# Patient Record
Sex: FEMALE | Race: WHITE | NOT HISPANIC OR LATINO | Employment: FULL TIME | URBAN - METROPOLITAN AREA
[De-identification: names, ages, dates, MRNs, and addresses within clinical notes are randomized per-mention and may not be internally consistent; named-entity substitution may affect disease eponyms.]

---

## 2023-12-01 ENCOUNTER — OFFICE VISIT (OUTPATIENT)
Dept: URGENT CARE | Facility: CLINIC | Age: 43
End: 2023-12-01
Payer: COMMERCIAL

## 2023-12-01 VITALS
OXYGEN SATURATION: 98 % | DIASTOLIC BLOOD PRESSURE: 99 MMHG | SYSTOLIC BLOOD PRESSURE: 139 MMHG | RESPIRATION RATE: 16 BRPM | TEMPERATURE: 97.7 F | HEART RATE: 76 BPM | WEIGHT: 200 LBS | BODY MASS INDEX: 31.32 KG/M2

## 2023-12-01 DIAGNOSIS — M54.12 CERVICAL RADICULOPATHY: Primary | ICD-10-CM

## 2023-12-01 PROCEDURE — 1036F TOBACCO NON-USER: CPT | Performed by: PHYSICIAN ASSISTANT

## 2023-12-01 PROCEDURE — 99214 OFFICE O/P EST MOD 30 MIN: CPT | Performed by: PHYSICIAN ASSISTANT

## 2023-12-01 RX ORDER — METHYLPREDNISOLONE 4 MG/1
TABLET ORAL
Qty: 21 TABLET | Refills: 0 | Status: SHIPPED | OUTPATIENT
Start: 2023-12-01

## 2023-12-01 RX ORDER — METHOCARBAMOL 750 MG/1
750 TABLET, FILM COATED ORAL 4 TIMES DAILY
Qty: 40 TABLET | Refills: 0 | Status: SHIPPED | OUTPATIENT
Start: 2023-12-01 | End: 2023-12-11

## 2023-12-01 ASSESSMENT — ENCOUNTER SYMPTOMS
FEVER: 0
NECK STIFFNESS: 1
EYE REDNESS: 0
BACK PAIN: 0
SHORTNESS OF BREATH: 0
NEUROLOGICAL NEGATIVE: 1
HEMATOLOGIC/LYMPHATIC NEGATIVE: 1
PSYCHIATRIC NEGATIVE: 1
FATIGUE: 0
WHEEZING: 0
EYE DISCHARGE: 0
ACTIVITY CHANGE: 0
EYE PAIN: 0
NECK PAIN: 1
COUGH: 0
APPETITE CHANGE: 0
ALLERGIC/IMMUNOLOGIC NEGATIVE: 1
COLOR CHANGE: 0
ENDOCRINE NEGATIVE: 1
ARTHRALGIAS: 0
WOUND: 0

## 2023-12-01 ASSESSMENT — PAIN SCALES - GENERAL: PAINLEVEL: 7

## 2023-12-01 NOTE — PROGRESS NOTES
Subjective   Patient ID: Dilcia Najera is a 43 y.o. female who presents for complaints of right-sided tingling sharp radiating pain in the right upper extremity that shoots down to digits 4 and 5 from posterior shoulder.  She denies any trauma to the spine.  Denies any fevers or chills.  Denies any history of IV drug use.  She otherwise is afebrile and nontoxic-appearing.  She does note that she feels like has had decreased strength in the right hand at times.    Past Medical History:   Diagnosis Date    Personal history of other diseases of the circulatory system     History of hypertension    Personal history of other diseases of the digestive system     History of esophageal reflux    Personal history of other diseases of the respiratory system     History of asthma       Review of Systems   Constitutional:  Negative for activity change, appetite change, fatigue and fever.   Eyes:  Negative for pain, discharge and redness.   Respiratory:  Negative for cough, shortness of breath and wheezing.    Cardiovascular:  Negative for chest pain and leg swelling.   Endocrine: Negative.    Musculoskeletal:  Positive for neck pain and neck stiffness. Negative for arthralgias, back pain and gait problem.   Skin:  Negative for color change, rash and wound.   Allergic/Immunologic: Negative.    Neurological: Negative.    Hematological: Negative.    Psychiatric/Behavioral: Negative.         Objective   BP (!) 139/99   Pulse 76   Temp 36.5 °C (97.7 °F)   Resp 16   Wt 90.7 kg (200 lb)   SpO2 98%   BMI 31.32 kg/m²   Physical Exam  Constitutional:       General: She is not in acute distress.     Appearance: Normal appearance. She is not ill-appearing, toxic-appearing or diaphoretic.   HENT:      Head: Normocephalic and atraumatic.      Mouth/Throat:      Mouth: Mucous membranes are moist.      Pharynx: Oropharynx is clear.   Eyes:      Conjunctiva/sclera: Conjunctivae normal.   Cardiovascular:      Rate and Rhythm: Normal rate  and regular rhythm.      Pulses:           Radial pulses are 2+ on the right side and 2+ on the left side.      Heart sounds: No murmur heard.  Pulmonary:      Effort: Pulmonary effort is normal.      Breath sounds: Normal breath sounds.   Musculoskeletal:         General: No swelling, tenderness, deformity or signs of injury. Normal range of motion.      Cervical back: Normal range of motion and neck supple.      Comments: There is no tenderness over the midline cervical spine   Skin:     General: Skin is warm and dry.      Findings: No erythema or rash.   Neurological:      General: No focal deficit present.      Mental Status: She is alert and oriented to person, place, and time.      Gait: Gait normal.      Comments: Sensation is intact distally into the bilateral upper extremities.  Strength is 5 out of 5 in bilateral upper extremities tested at the shoulder the elbow and the wrist bilaterally  strength is 5 out of 5 bilaterally         Assessment/Plan   Problem List Items Addressed This Visit       Cervical radiculopathy - Primary    Relevant Medications    methylPREDNISolone (Medrol Dospak) 4 mg tablets    methocarbamol (Robaxin-750) 750 mg tablet   -Discussed with patient that the history and physical exam raise high suspicion for likely cervical radiculopathy.  Treating with methylprednisolone pack as well as methocarbamol given the tightness in the neck.  No notable strength deficit at time of exam though I did discuss with patient if she has any worsening strength in the right upper extremity I would recommend prompt follow-up.    -No risk factors for central cord syndrome, spinal epidural abscess or other emergent etiologies of this complaint

## 2023-12-01 NOTE — PATIENT INSTRUCTIONS
Assessment/Plan   Problem List Items Addressed This Visit       Cervical radiculopathy - Primary    Relevant Medications    methylPREDNISolone (Medrol Dospak) 4 mg tablets    methocarbamol (Robaxin-750) 750 mg tablet   -Discussed with patient that the history and physical exam raise high suspicion for likely cervical radiculopathy.  Treating with methylprednisolone pack as well as methocarbamol given the tightness in the neck.  No notable strength deficit at time of exam though I did discuss with patient if she has any worsening strength in the right upper extremity I would recommend prompt follow-up.    -No risk factors for central cord syndrome, spinal epidural abscess or other emergent etiologies of this complaint

## 2023-12-15 ENCOUNTER — OFFICE VISIT (OUTPATIENT)
Dept: URGENT CARE | Facility: CLINIC | Age: 43
End: 2023-12-15
Payer: COMMERCIAL

## 2023-12-15 VITALS
WEIGHT: 200 LBS | TEMPERATURE: 97.9 F | HEART RATE: 92 BPM | OXYGEN SATURATION: 95 % | DIASTOLIC BLOOD PRESSURE: 95 MMHG | RESPIRATION RATE: 18 BRPM | SYSTOLIC BLOOD PRESSURE: 153 MMHG | BODY MASS INDEX: 31.32 KG/M2

## 2023-12-15 DIAGNOSIS — J01.90 ACUTE SINUSITIS WITH SYMPTOMS > 10 DAYS: Primary | ICD-10-CM

## 2023-12-15 PROBLEM — E66.9 OBESITY, CLASS I, BMI 30-34.9: Status: ACTIVE | Noted: 2021-09-07

## 2023-12-15 PROBLEM — E66.811 OBESITY, CLASS I, BMI 30-34.9: Status: ACTIVE | Noted: 2021-09-07

## 2023-12-15 PROCEDURE — 99213 OFFICE O/P EST LOW 20 MIN: CPT | Performed by: PHYSICIAN ASSISTANT

## 2023-12-15 PROCEDURE — 1036F TOBACCO NON-USER: CPT | Performed by: PHYSICIAN ASSISTANT

## 2023-12-15 RX ORDER — AMOXICILLIN AND CLAVULANATE POTASSIUM 875; 125 MG/1; MG/1
1 TABLET, FILM COATED ORAL 2 TIMES DAILY
Qty: 14 TABLET | Refills: 0 | Status: SHIPPED | OUTPATIENT
Start: 2023-12-15 | End: 2023-12-22

## 2023-12-15 ASSESSMENT — PAIN SCALES - GENERAL: PAINLEVEL: 6

## 2023-12-15 NOTE — PATIENT INSTRUCTIONS
Assessment/Plan   Problem List Items Addressed This Visit       Acute sinusitis with symptoms > 10 days - Primary    Relevant Medications    amoxicillin-pot clavulanate (Augmentin) 875-125 mg tablet   Sinusitis Plan:  1. Take medication as directed for therapy:  2. Follow up with your family doctor in 3-5 days.    Symptoms may last greater than 14 days, but expect improving trend in less than 10 days; purulent nasal discharge alone does not indicate bacterial infection.    Sinusitis is defined as acute if it is totally resolved in less than 30 days. In people who have a normally functioning immune system . Acute sinusitis is usually caused by a viral infection. Sometimes acute sinusitis is caused by bacteria. Infection often develops after something blocks the openings to the sinuses. Such blockage commonly results from a viral infection of the upper airways, such as the common cold. During a cold, the swollen mucous membranes of the nasal cavity tend to block the openings of the sinuses. Air in the sinuses is absorbed into the bloodstream, and the pressure inside the sinuses decreases, causing pain and drawing fluid into the sinuses. This fluid is a breeding ground for bacteria. White blood cells and more fluid enter the sinuses to fight the bacteria. This influx increases the pressure and causes more pain.

## 2023-12-15 NOTE — PROGRESS NOTES
Subjective   Patient ID: Dilcia Najera is a 43 y.o. female who presents for Nasal Congestion (Swollen lymph nodes x 2 week negative covid today).  Patient endorses nasal congestion, purulent nasal discharge that is been worsening over the course the last 2 weeks.  She denies any nausea vomiting diarrhea or abdominal pain any chest pain or shortness of breath.  She did test for COVID today and this was negative.    Past Medical History:   Diagnosis Date    Personal history of other diseases of the circulatory system     History of hypertension    Personal history of other diseases of the digestive system     History of esophageal reflux    Personal history of other diseases of the respiratory system     History of asthma         The remainder of the systems were reviewed and are negative unless noted above      Objective   BP (!) 153/95   Pulse 92   Temp 36.6 °C (97.9 °F)   Resp 18   Wt 90.7 kg (200 lb)   SpO2 95%   BMI 31.32 kg/m²   Physical Exam  Vitals reviewed.   Constitutional:       General: She is not in acute distress.     Appearance: Normal appearance. She is not toxic-appearing.   HENT:      Head: Normocephalic.      Comments: TTP to the maxillary sinuses bilaterally     Right Ear: Tympanic membrane and ear canal normal. No tenderness. No mastoid tenderness.      Left Ear: Tympanic membrane and ear canal normal. No tenderness. No mastoid tenderness.      Nose: Congestion and rhinorrhea present.      Mouth/Throat:      Mouth: Mucous membranes are moist.      Pharynx: Oropharynx is clear. Posterior oropharyngeal erythema present.   Eyes:      Conjunctiva/sclera: Conjunctivae normal.      Pupils: Pupils are equal, round, and reactive to light.   Cardiovascular:      Rate and Rhythm: Normal rate and regular rhythm.      Heart sounds: No murmur heard.  Pulmonary:      Effort: No respiratory distress.      Breath sounds: No stridor. No wheezing, rhonchi or rales.   Chest:      Chest wall: No tenderness.    Abdominal:      Tenderness: There is no abdominal tenderness. There is no guarding.   Musculoskeletal:         General: Normal range of motion.   Skin:     General: Skin is warm and dry.      Capillary Refill: Capillary refill takes less than 2 seconds.      Findings: No erythema.   Neurological:      General: No focal deficit present.      Mental Status: She is alert.         Assessment/Plan   Problem List Items Addressed This Visit       Acute sinusitis with symptoms > 10 days - Primary    Relevant Medications    amoxicillin-pot clavulanate (Augmentin) 875-125 mg tablet      -Symptoms ongoing greater than 2 weeks now.  Given the longevity treating as acute bacterial rhinosinusitis with Augmentin.  Recommending Flonase, nasal saline irrigation, steam treatments

## 2024-08-13 ENCOUNTER — OFFICE VISIT (OUTPATIENT)
Dept: ORTHOPEDIC SURGERY | Facility: CLINIC | Age: 44
End: 2024-08-13
Payer: COMMERCIAL

## 2024-08-13 VITALS — HEIGHT: 67 IN | WEIGHT: 209 LBS | BODY MASS INDEX: 32.8 KG/M2

## 2024-08-13 DIAGNOSIS — S93.601A RIGHT FOOT SPRAIN, INITIAL ENCOUNTER: Primary | ICD-10-CM

## 2024-08-13 PROCEDURE — 99203 OFFICE O/P NEW LOW 30 MIN: CPT | Performed by: FAMILY MEDICINE

## 2024-08-13 PROCEDURE — 3008F BODY MASS INDEX DOCD: CPT | Performed by: FAMILY MEDICINE

## 2024-08-13 PROCEDURE — 1036F TOBACCO NON-USER: CPT | Performed by: FAMILY MEDICINE

## 2024-08-13 RX ORDER — HYDROCHLOROTHIAZIDE 25 MG/1
25 TABLET ORAL
COMMUNITY
Start: 2024-07-31 | End: 2024-10-29

## 2024-08-13 RX ORDER — PANTOPRAZOLE SODIUM 40 MG/1
40 TABLET, DELAYED RELEASE ORAL
COMMUNITY
Start: 2024-05-17 | End: 2025-05-17

## 2024-08-13 NOTE — PROGRESS NOTES
History of Present Illness   Chief Complaint   Patient presents with    Right Foot - Pain, Injury     DOI:8/9/24  WAS GETTING OUT OF HER AND STEPPED WRONG       The patient is 44 y.o. female  here with a complaint of right foot pain.  Onset of symptoms 4 days ago, says she was stepping out of the car, stepped wrong with some acute onset of pain in the forefoot.  She was seen in urgent care 2 days later, she had x-rays obtained which are available for review, these were read as unremarkable by radiologist, she was given a boot and recommended outpatient follow-up.  She says that she is able to bear weight in the boot but is walking mostly through her heel, has more pain when she walks to her forefoot.  She says there was some swelling initially but seems to have gotten better, she denies any bruising.  Patient works at a nursing home, does mostly desk work, says she needs a note to return to work.  She has been using ibuprofen and icing.    Past Medical History:   Diagnosis Date    Personal history of other diseases of the circulatory system     History of hypertension    Personal history of other diseases of the digestive system     History of esophageal reflux    Personal history of other diseases of the respiratory system     History of asthma       Medication Documentation Review Audit    **Prior to Admission medications have not yet been reviewed**         Allergies   Allergen Reactions    Lisinopril Cough    Flexeril [Cyclobenzaprine] Rash       Social History     Socioeconomic History    Marital status: Single     Spouse name: Not on file    Number of children: Not on file    Years of education: Not on file    Highest education level: Not on file   Occupational History    Not on file   Tobacco Use    Smoking status: Never    Smokeless tobacco: Never   Substance and Sexual Activity    Alcohol use: Yes     Comment: MODERATELY    Drug use: Never    Sexual activity: Not on file   Other Topics Concern    Not on  file   Social History Narrative    Not on file     Social Determinants of Health     Financial Resource Strain: Medium Risk (5/24/2024)    Received from [facility]    Overall Financial Resource Strain (CARDIA)     Difficulty of Paying Living Expenses: Somewhat hard   Food Insecurity: Food Insecurity Present (5/24/2024)    Received from [facility]    Hunger Vital Sign     Worried About Running Out of Food in the Last Year: Sometimes true     Ran Out of Food in the Last Year: Never true   Transportation Needs: No Transportation Needs (5/24/2024)    Received from [facility]    PRAPARE - Transportation     Lack of Transportation (Medical): No     Lack of Transportation (Non-Medical): No   Physical Activity: Patient Declined (5/24/2024)    Received from [facility]    Exercise Vital Sign     Days of Exercise per Week: Patient declined     Minutes of Exercise per Session: Patient declined   Stress: Stress Concern Present (5/24/2024)    Received from [facility]    [place] [employer] - Occupational Stress Questionnaire     Feeling of Stress : To some extent   Social Connections: Moderately Integrated (5/24/2024)    Received from [facility]    Social Connection and Isolation Panel [NHANES]     Frequency of Communication with Friends and Family: Three times a week     Frequency of Social Gatherings with Friends and Family: Three times a week     Attends Baptism Services: 1 to 4 times per year     Active Member of Clubs or Organizations: No     Attends Club or Organization Meetings: Never     Marital Status:    Intimate Partner Violence: Not on file   Housing Stability: Not on file       No past surgical history on file.       Review of Systems   GENERAL: Negative  GI: Negative  MUSCULOSKELETAL: See HPI  SKIN: Negative  NEURO:  Negative     Physical Exam:    General/Constitutional: well appearing, no distress, appears stated age  HEENT: sclera  clear  Respiratory: non labored breathing  Vascular: No edema, swelling or tenderness, except as noted in detailed exam.  Integumentary: No impressive skin lesions present, except as noted in detailed exam.  Neurological:  Alert and oriented   Psychological:  Normal mood and affect.  Musculoskeletal: Normal, except as noted in detailed exam and in HPI.  Antalgic gait with attempted weightbearing unassisted      Right foot: Normal appearance, no swelling, no ecchymosis.  She is tender most notably at the fourth metatarsal shaft, she is nontender at the base of the fifth metatarsal or fifth metatarsal shaft, mild pain in the midfoot region.  She has relatively preserved range of motion at the ankle passively but is more limited actively with pain.  There is pain but no weakness with strength testing at the ankle.  Sensation intact to light touch, 2+ pedal pulses are present     Imaging: X-rays of right foot from urgent care from 8/11/2024 independently reviewed.  No acute abnormalities are seen, no fractures identified, no significant swelling.  There is a small heel spur      Assessment   1. Right foot sprain, initial encounter            Plan: Discussed diagnosis, further workup and treatment.  X-rays reviewed.  Recommend conservative management.  She can continue with boot for comfort as needed over the next 1 to 2 weeks, did encourage her to come out of the boot to do some gentle range of motion exercises, she will continue with over-the-counter medications, icing as needed, she will follow-up if symptoms are ongoing despite conservative management over the next several weeks.

## 2024-08-13 NOTE — LETTER
August 13, 2024     Patient: Dilcia Najera   YOB: 1980   Date of Visit: 8/13/2024       To Whom It May Concern:    It is my medical opinion that Dilcia Najera may return to full duty immediately with no restrictions.    If you have any questions or concerns, please don't hesitate to call.         Sincerely,        Mauricio Fiore MD    CC: No Recipients

## 2025-03-19 ENCOUNTER — APPOINTMENT (OUTPATIENT)
Dept: PRIMARY CARE | Facility: CLINIC | Age: 45
End: 2025-03-19
Payer: COMMERCIAL

## 2025-03-19 VITALS
DIASTOLIC BLOOD PRESSURE: 89 MMHG | SYSTOLIC BLOOD PRESSURE: 141 MMHG | WEIGHT: 220 LBS | HEART RATE: 89 BPM | OXYGEN SATURATION: 96 % | BODY MASS INDEX: 34.53 KG/M2 | HEIGHT: 67 IN

## 2025-03-19 DIAGNOSIS — E78.5 DYSLIPIDEMIA: ICD-10-CM

## 2025-03-19 DIAGNOSIS — M54.9 BACK PAIN, UNSPECIFIED BACK LOCATION, UNSPECIFIED BACK PAIN LATERALITY, UNSPECIFIED CHRONICITY: ICD-10-CM

## 2025-03-19 DIAGNOSIS — Z00.00 ROUTINE GENERAL MEDICAL EXAMINATION AT A HEALTH CARE FACILITY: ICD-10-CM

## 2025-03-19 DIAGNOSIS — M32.9 SYSTEMIC LUPUS ERYTHEMATOSUS, UNSPECIFIED SLE TYPE, UNSPECIFIED ORGAN INVOLVEMENT STATUS (MULTI): ICD-10-CM

## 2025-03-19 DIAGNOSIS — R73.02 IGT (IMPAIRED GLUCOSE TOLERANCE): ICD-10-CM

## 2025-03-19 DIAGNOSIS — D64.9 ANEMIA, UNSPECIFIED TYPE: Primary | ICD-10-CM

## 2025-03-19 DIAGNOSIS — E03.9 HYPOTHYROIDISM, UNSPECIFIED TYPE: ICD-10-CM

## 2025-03-19 LAB
NON-UH HIE A/G RATIO: 1.1
NON-UH HIE ALB: 3.8 G/DL (ref 3.4–5)
NON-UH HIE ALK PHOS: 105 UNIT/L (ref 45–117)
NON-UH HIE BASO COUNT: 0.09 X1000 (ref 0–0.2)
NON-UH HIE BASOS %: 0.9 %
NON-UH HIE BILIRUBIN, TOTAL: 0.7 MG/DL (ref 0.3–1.2)
NON-UH HIE BUN/CREAT RATIO: 21.4
NON-UH HIE BUN: 15 MG/DL (ref 9–23)
NON-UH HIE CALCIUM: 9.8 MG/DL (ref 8.7–10.4)
NON-UH HIE CALCULATED LDL CHOLESTEROL: 121 MG/DL (ref 60–130)
NON-UH HIE CALCULATED OSMOLALITY: 278 MOSM/KG (ref 275–295)
NON-UH HIE CHLORIDE: 102 MMOL/L (ref 98–107)
NON-UH HIE CHOLESTEROL: 217 MG/DL (ref 100–200)
NON-UH HIE CO2, VENOUS: 27 MMOL/L (ref 20–31)
NON-UH HIE CREATININE: 0.7 MG/DL (ref 0.5–0.8)
NON-UH HIE DIFF?: NORMAL
NON-UH HIE EOS COUNT: 0.15 X1000 (ref 0–0.5)
NON-UH HIE EOSIN %: 1.5 %
NON-UH HIE GFR AA: >60
NON-UH HIE GLOBULIN: 3.6 G/DL
NON-UH HIE GLOMERULAR FILTRATION RATE: >60 ML/MIN/1.73M?
NON-UH HIE GLUCOSE: 83 MG/DL (ref 74–106)
NON-UH HIE GOT: 40 UNIT/L (ref 15–37)
NON-UH HIE GPT: 65 UNIT/L (ref 10–49)
NON-UH HIE HCT: 42.8 % (ref 36–46)
NON-UH HIE HDL CHOLESTEROL: 66 MG/DL (ref 40–60)
NON-UH HIE HGB A1C: 4.7 %
NON-UH HIE HGB: 14.3 G/DL (ref 12–16)
NON-UH HIE INSTR WBC: 9.9
NON-UH HIE K: 4.1 MMOL/L (ref 3.5–5.1)
NON-UH HIE LYMPH %: 26.6 %
NON-UH HIE LYMPH COUNT: 2.62 X1000 (ref 1.2–4.8)
NON-UH HIE MCH: 30.5 PG (ref 27–34)
NON-UH HIE MCHC: 33.4 G/DL (ref 32–37)
NON-UH HIE MCV: 91.3 FL (ref 80–100)
NON-UH HIE MONO %: 6.4 %
NON-UH HIE MONO COUNT: 0.63 X1000 (ref 0.1–1)
NON-UH HIE MPV: 9.4 FL (ref 7.4–10.4)
NON-UH HIE NA: 139 MMOL/L (ref 135–145)
NON-UH HIE NEUTROPHIL %: 64.6 %
NON-UH HIE NEUTROPHIL COUNT (ANC): 6.37 X1000 (ref 1.4–8.8)
NON-UH HIE NUCLEATED RBC: 0 /100WBC
NON-UH HIE PLATELET: 304 X10 (ref 150–450)
NON-UH HIE RBC: 4.69 X10 (ref 4.2–5.4)
NON-UH HIE RDW: 13.9 % (ref 11.5–14.5)
NON-UH HIE TOTAL CHOL/HDL CHOL RATIO: 3.3
NON-UH HIE TOTAL PROTEIN: 7.4 G/DL (ref 5.7–8.2)
NON-UH HIE TRIGLYCERIDES: 151 MG/DL (ref 30–150)
NON-UH HIE TSH: 1 UIU/ML (ref 0.55–4.78)
NON-UH HIE WBC: 9.9 X10 (ref 4.5–11)

## 2025-03-19 PROCEDURE — 99203 OFFICE O/P NEW LOW 30 MIN: CPT | Performed by: EMERGENCY MEDICINE

## 2025-03-19 PROCEDURE — 3008F BODY MASS INDEX DOCD: CPT | Performed by: EMERGENCY MEDICINE

## 2025-03-19 PROCEDURE — 1036F TOBACCO NON-USER: CPT | Performed by: EMERGENCY MEDICINE

## 2025-03-19 PROCEDURE — 99386 PREV VISIT NEW AGE 40-64: CPT | Performed by: EMERGENCY MEDICINE

## 2025-03-19 RX ORDER — MONTELUKAST SODIUM 10 MG/1
TABLET ORAL
COMMUNITY
Start: 2015-05-05

## 2025-03-19 RX ORDER — ALBUTEROL SULFATE 0.83 MG/ML
2.5 SOLUTION RESPIRATORY (INHALATION) EVERY 4 HOURS PRN
COMMUNITY
Start: 2023-08-15

## 2025-03-19 RX ORDER — TIZANIDINE 4 MG/1
4 TABLET ORAL NIGHTLY PRN
Qty: 15 TABLET | Refills: 0 | Status: SHIPPED | OUTPATIENT
Start: 2025-03-19 | End: 2025-04-03

## 2025-03-19 RX ORDER — ALBUTEROL SULFATE 90 UG/1
1 INHALANT RESPIRATORY (INHALATION) EVERY 4 HOURS PRN
COMMUNITY
Start: 2015-05-05

## 2025-03-19 ASSESSMENT — PATIENT HEALTH QUESTIONNAIRE - PHQ9
2. FEELING DOWN, DEPRESSED OR HOPELESS: NOT AT ALL
SUM OF ALL RESPONSES TO PHQ9 QUESTIONS 1 AND 2: 0
1. LITTLE INTEREST OR PLEASURE IN DOING THINGS: NOT AT ALL

## 2025-03-19 NOTE — PROGRESS NOTES
Subjective   Patient ID: Dilcia Najera is a 44 y.o. female who presents for Establish Care and Hypertension.    Assessment/Plan   Problem List Items Addressed This Visit    None  Visit Diagnoses       Anemia, unspecified type    -  Primary    Relevant Orders    CBC and Auto Differential    Routine general medical examination at a health care facility        Relevant Orders    Comprehensive Metabolic Panel    IGT (impaired glucose tolerance)        Relevant Orders    Hemoglobin A1C    Dyslipidemia        Relevant Orders    Lipid Panel    Hypothyroidism, unspecified type        Relevant Orders    TSH with reflex to Free T4 if abnormal    Systemic lupus erythematosus, unspecified SLE type, unspecified organ involvement status (Multi)        Relevant Orders    ALLEGRA with Reflex to FAWN    Anti-DNA Antibody, Double-Stranded    Back pain, unspecified back location, unspecified back pain laterality, unspecified chronicity        Relevant Medications    tiZANidine (Zanaflex) 4 mg tablet        Suspicion of lupus-will check ALLEGRA and double-stranded DNA    Back pain-we will get lumbar spine x-rays and order physical therapy and I will treat her with muscle relaxant.  She was given steroid that did not help    Hypertension-continue hydrochlorothiazide    Asthma/COPD-on albuterol    Follow-up in 6 months or as needed    Source of history: Nurse, Medical personnel, Medical record, Patient.  History limitation: None.    HPI    44-year-old here for new patient physical and office visit    States that she has a rash on her face and some other doctor was checking her for lupus but she could not get the follow-up and wants to know if I can check for lupus    As back pain for a while and was seen in the emergency room where no clear cause was found.      Social history-denies smoking.  Drinks 2 to 3 glasses of wine per week    Allergies   Allergen Reactions    Lisinopril Cough    Flexeril [Cyclobenzaprine] Rash       Current Outpatient  "Medications   Medication Sig Dispense Refill    albuterol 2.5 mg /3 mL (0.083 %) nebulizer solution Inhale 3 mL (2.5 mg) every 4 hours if needed.      albuterol 90 mcg/actuation inhaler Inhale 1 puff every 4 hours if needed.      hydroCHLOROthiazide (HYDRODiuril) 25 mg tablet Take 1 tablet (25 mg) by mouth once daily.      montelukast (Singulair) 10 mg tablet Take by mouth.      pantoprazole (ProtoNix) 40 mg EC tablet Take 1 tablet (40 mg) by mouth.      methocarbamol (Robaxin-750) 750 mg tablet Take 1 tablet (750 mg) by mouth 4 times a day for 10 days. (Patient not taking: Reported on 3/19/2025) 40 tablet 0    methylPREDNISolone (Medrol Dospak) 4 mg tablets Follow schedule on package instructions (Patient not taking: Reported on 3/19/2025) 21 tablet 0    tiZANidine (Zanaflex) 4 mg tablet Take 1 tablet (4 mg) by mouth as needed at bedtime for muscle spasms for up to 15 days. 15 tablet 0     No current facility-administered medications for this visit.       Objective   Visit Vitals  /89   Pulse 89   Ht 1.702 m (5' 7\")   Wt 99.8 kg (220 lb)   SpO2 96%   BMI 34.46 kg/m²   Smoking Status Never   BSA 2.17 m²     Physical Exam  Vital signs as per nursing/MA documentation  General appearance: Alert and in no acute distress  HEENT: Normal Inspection  Neck - Normal Inspection  Respiratory : No respiratory distress. Lungs are clear   Cardiovascular: heart rate normal. No gallop  Back - normal inspection  Skin inspection:Warm  Musculoskeletal : No deformities  Neuro : Limited exam. Baseline    Review of Systems   Comprehensive review of systems as allowed by patient condition and nursing input is negative    Results including lab work, imaging reports were reviewed and wherever possible, independently verified    No family history on file.  Social History     Socioeconomic History    Marital status:    Tobacco Use    Smoking status: Never    Smokeless tobacco: Never   Substance and Sexual Activity    Alcohol use: " Yes     Comment: MODERATELY    Drug use: Never     Social Drivers of Health     Financial Resource Strain: Medium Risk (5/24/2024)    Received from [facility]    Overall Financial Resource Strain (CARDIA)     Difficulty of Paying Living Expenses: Somewhat hard   Food Insecurity: Food Insecurity Present (5/24/2024)    Received from [facility]    Hunger Vital Sign     Worried About Running Out of Food in the Last Year: Sometimes true     Ran Out of Food in the Last Year: Never true   Transportation Needs: No Transportation Needs (5/24/2024)    Received from [facility]    PRAPARE - Transportation     Lack of Transportation (Medical): No     Lack of Transportation (Non-Medical): No   Physical Activity: Patient Declined (5/24/2024)    Received from [facility]    Exercise Vital Sign     Days of Exercise per Week: Patient declined     Minutes of Exercise per Session: Patient declined   Stress: Stress Concern Present (5/24/2024)    Received from [facility]    [place] [employer] - Occupational Stress Questionnaire     Feeling of Stress : To some extent   Social Connections: Moderately Integrated (5/24/2024)    Received from [facility]    Social Connection and Isolation Panel [NHANES]     Frequency of Communication with Friends and Family: Three times a week     Frequency of Social Gatherings with Friends and Family: Three times a week     Attends Jainism Services: 1 to 4 times per year     Active Member of Clubs or Organizations: No     Attends Club or Organization Meetings: Never     Marital Status:      Past Medical History:   Diagnosis Date    Personal history of other diseases of the circulatory system     History of hypertension    Personal history of other diseases of the digestive system     History of esophageal reflux    Personal history of other diseases of the respiratory system     History of asthma     History reviewed. No pertinent surgical  history.    Charting was completed using voice recognition technology and may include unintended errors.

## 2025-03-20 LAB
NON-UH HIE ANTI-DNA: NORMAL
NON-UH HIE ANTI-NUCLEAR ANTIBODY: NORMAL

## 2025-05-12 ENCOUNTER — APPOINTMENT (OUTPATIENT)
Dept: PRIMARY CARE | Facility: CLINIC | Age: 45
End: 2025-05-12
Payer: COMMERCIAL

## 2025-05-12 DIAGNOSIS — J45.909 ASTHMA, UNSPECIFIED ASTHMA SEVERITY, UNSPECIFIED WHETHER COMPLICATED, UNSPECIFIED WHETHER PERSISTENT (HHS-HCC): ICD-10-CM

## 2025-05-12 DIAGNOSIS — J01.90 ACUTE SINUSITIS WITH SYMPTOMS > 10 DAYS: ICD-10-CM

## 2025-05-12 DIAGNOSIS — N83.299 COMPLEX OVARIAN CYST: ICD-10-CM

## 2025-05-12 DIAGNOSIS — E66.811 OBESITY, CLASS I, BMI 30-34.9: ICD-10-CM

## 2025-05-12 DIAGNOSIS — M54.12 CERVICAL RADICULOPATHY: ICD-10-CM

## 2025-05-12 DIAGNOSIS — R05.9 COUGH, UNSPECIFIED TYPE: Primary | ICD-10-CM

## 2025-05-12 DIAGNOSIS — A49.9 BACTERIAL INFECTION: ICD-10-CM

## 2025-05-12 PROCEDURE — 99213 OFFICE O/P EST LOW 20 MIN: CPT | Performed by: EMERGENCY MEDICINE

## 2025-05-12 PROCEDURE — 1036F TOBACCO NON-USER: CPT | Performed by: EMERGENCY MEDICINE

## 2025-05-12 RX ORDER — AZITHROMYCIN 250 MG/1
250 TABLET, FILM COATED ORAL DAILY
Qty: 6 TABLET | Refills: 0 | Status: SHIPPED | OUTPATIENT
Start: 2025-05-12 | End: 2025-05-17

## 2025-05-12 ASSESSMENT — ENCOUNTER SYMPTOMS: COUGH: 1

## 2025-05-12 NOTE — PROGRESS NOTES
Subjective   Patient ID: Dilcia Najera is a 45 y.o. female who presents for Cough (Ongoing for about 2 weeks, shortness of breath) and Results.    Assessment/Plan   Problem List Items Addressed This Visit    None    Bronchitis-Z-Griff[place]    Back pain-we will get lumbar spine x-rays and order physical therapy and I will treat her with muscle relaxant.  She was given steroid that did not help    Hypertension-continue hydrochlorothiazide    Asthma/COPD-on albuterol    Follow-up in 3 months or as needed    Source of history: Nurse, Medical personnel, Medical record, Patient.  History limitation: None.    Cough  This is a new problem. The current episode started 1 to 4 weeks ago. The problem has been gradually worsening. The problem occurs hourly. The cough is Non-productive and productive of sputum. Pertinent negatives include no chest pain.      This visit was completed virtually due to the restrictions of the COVID-19 pandemic. All issues as below were discussed and addressed but no physical exam was performed. If it was felt that the patient should be evaluated in clinic or ER, then they were directed there. The patient verbally consented to visit    44-year-old here for new patient physical and office visit    States that she has a rash on her face and some other doctor was checking her for lupus but she could not get the follow-up and wants to know if I can check for lupus    As back pain for a while and was seen in the emergency room where no clear cause was found.      Social history-denies smoking.  Drinks 2 to 3 glasses of wine per week    Allergies   Allergen Reactions    Lisinopril Cough    Flexeril [Cyclobenzaprine] Rash       Current Outpatient Medications   Medication Sig Dispense Refill    albuterol 2.5 mg /3 mL (0.083 %) nebulizer solution Inhale 3 mL (2.5 mg) every 4 hours if needed.      albuterol 90 mcg/actuation inhaler Inhale 1 puff every 4 hours if needed.      montelukast (Singulair) 10 mg tablet Take  by mouth.      pantoprazole (ProtoNix) 40 mg EC tablet Take 1 tablet (40 mg) by mouth.      methocarbamol (Robaxin-750) 750 mg tablet Take 1 tablet (750 mg) by mouth 4 times a day for 10 days. 40 tablet 0    methylPREDNISolone (Medrol Dospak) 4 mg tablets Follow schedule on package instructions 21 tablet 0    tiZANidine (Zanaflex) 4 mg tablet Take 1 tablet (4 mg) by mouth as needed at bedtime for muscle spasms for up to 15 days. 15 tablet 0     No current facility-administered medications for this visit.       Objective   Visit Vitals  Smoking Status Never     Physical Exam  Vital signs as per nursing/MA documentation  General appearance: Alert and in no acute distress  HEENT: Normal Inspection  Neck - Normal Inspection  Respiratory : No respiratory distress. Lungs are clear   Cardiovascular: heart rate normal. No gallop  Back - normal inspection  Skin inspection:Warm  Musculoskeletal : No deformities  Neuro : Limited exam. Baseline    Review of Systems   Respiratory:  Positive for cough.    Cardiovascular:  Negative for chest pain.      Comprehensive review of systems as allowed by patient condition and nursing input is negative    Results including lab work, imaging reports were reviewed and wherever possible, independently verified    No family history on file.  Social History     Socioeconomic History    Marital status:    Tobacco Use    Smoking status: Never    Smokeless tobacco: Never   Substance and Sexual Activity    Alcohol use: Yes     Comment: MODERATELY    Drug use: Never     Social Drivers of Health     Financial Resource Strain: Medium Risk (5/24/2024)    Received from [facility]    Overall Financial Resource Strain (CARDIA)     Difficulty of Paying Living Expenses: Somewhat hard   Food Insecurity: Food Insecurity Present (5/24/2024)    Received from [facility]    Hunger Vital Sign     Worried About Running Out of Food in the Last Year: Sometimes true     Ran Out of Food in the Last  Year: Never true   Transportation Needs: No Transportation Needs (5/24/2024)    Received from [facility]    PRAPARE - Transportation     Lack of Transportation (Medical): No     Lack of Transportation (Non-Medical): No   Physical Activity: Patient Declined (5/24/2024)    Received from [facility]    Exercise Vital Sign     Days of Exercise per Week: Patient declined     Minutes of Exercise per Session: Patient declined   Stress: Stress Concern Present (5/24/2024)    Received from [facility]    [employer] [place] [employer] - Occupational Stress Questionnaire     Feeling of Stress : To some extent   Social Connections: Moderately Integrated (5/24/2024)    Received from [facility]    Social Connection and Isolation Panel [NHANES]     Frequency of Communication with Friends and Family: Three times a week     Frequency of Social Gatherings with Friends and Family: Three times a week     Attends [employer] Services: 1 to 4 times per year     Active Member of Clubs or Organizations: No     Attends Club or Organization Meetings: Never     Marital Status:      Past Medical History:   Diagnosis Date    Personal history of other diseases of the circulatory system     History of hypertension    Personal history of other diseases of the digestive system     History of esophageal reflux    Personal history of other diseases of the respiratory system     History of asthma     No past surgical history on file.    Charting was completed using voice recognition technology and may include unintended errors.

## 2025-05-15 ENCOUNTER — TELEPHONE (OUTPATIENT)
Dept: PRIMARY CARE | Facility: CLINIC | Age: 45
End: 2025-05-15
Payer: COMMERCIAL

## 2025-07-23 ENCOUNTER — TELEPHONE (OUTPATIENT)
Dept: PRIMARY CARE | Facility: CLINIC | Age: 45
End: 2025-07-23
Payer: COMMERCIAL

## 2025-07-23 DIAGNOSIS — I15.9 SECONDARY HYPERTENSION: ICD-10-CM

## 2025-07-23 DIAGNOSIS — K21.9 GASTROESOPHAGEAL REFLUX DISEASE, UNSPECIFIED WHETHER ESOPHAGITIS PRESENT: ICD-10-CM

## 2025-07-23 RX ORDER — HYDROCHLOROTHIAZIDE 25 MG/1
25 TABLET ORAL DAILY
Qty: 90 TABLET | Refills: 1 | Status: SHIPPED | OUTPATIENT
Start: 2025-07-23

## 2025-07-23 RX ORDER — PANTOPRAZOLE SODIUM 40 MG/1
40 TABLET, DELAYED RELEASE ORAL
Qty: 90 TABLET | Refills: 1 | Status: SHIPPED | OUTPATIENT
Start: 2025-07-23 | End: 2026-07-23

## 2025-07-29 ENCOUNTER — HOSPITAL ENCOUNTER (OUTPATIENT)
Dept: RADIOLOGY | Facility: CLINIC | Age: 45
Discharge: HOME | End: 2025-07-29
Payer: COMMERCIAL

## 2025-07-29 DIAGNOSIS — Z12.31 ENCOUNTER FOR SCREENING MAMMOGRAM FOR MALIGNANT NEOPLASM OF BREAST: ICD-10-CM

## 2025-08-08 ENCOUNTER — HOSPITAL ENCOUNTER (OUTPATIENT)
Dept: RADIOLOGY | Facility: EXTERNAL LOCATION | Age: 45
Discharge: HOME | End: 2025-08-08

## 2025-08-08 ENCOUNTER — HOSPITAL ENCOUNTER (OUTPATIENT)
Dept: RADIOLOGY | Facility: CLINIC | Age: 45
Discharge: HOME | End: 2025-08-08
Payer: COMMERCIAL

## 2025-08-08 VITALS — WEIGHT: 220 LBS | BODY MASS INDEX: 34.46 KG/M2

## 2025-08-08 DIAGNOSIS — Z12.31 ENCOUNTER FOR SCREENING MAMMOGRAM FOR MALIGNANT NEOPLASM OF BREAST: ICD-10-CM

## 2025-08-08 DIAGNOSIS — N63.0 UNSPECIFIED LUMP IN UNSPECIFIED BREAST: ICD-10-CM

## 2025-08-08 PROCEDURE — 77062 BREAST TOMOSYNTHESIS BI: CPT | Performed by: RADIOLOGY

## 2025-08-08 PROCEDURE — 77066 DX MAMMO INCL CAD BI: CPT | Performed by: RADIOLOGY

## 2025-08-08 PROCEDURE — 76642 ULTRASOUND BREAST LIMITED: CPT | Performed by: RADIOLOGY

## 2025-08-08 PROCEDURE — 76642 ULTRASOUND BREAST LIMITED: CPT

## 2025-08-08 PROCEDURE — 77062 BREAST TOMOSYNTHESIS BI: CPT

## 2025-08-20 ENCOUNTER — TELEPHONE (OUTPATIENT)
Dept: PRIMARY CARE | Facility: CLINIC | Age: 45
End: 2025-08-20
Payer: COMMERCIAL

## 2025-08-20 DIAGNOSIS — Z00.00 ROUTINE GENERAL MEDICAL EXAMINATION AT A HEALTH CARE FACILITY: ICD-10-CM

## 2025-08-20 RX ORDER — SERTRALINE HYDROCHLORIDE 50 MG/1
TABLET, FILM COATED ORAL
Qty: 90 TABLET | Refills: 1 | Status: SHIPPED | OUTPATIENT
Start: 2025-08-20